# Patient Record
Sex: FEMALE | Race: BLACK OR AFRICAN AMERICAN | NOT HISPANIC OR LATINO | Employment: FULL TIME | URBAN - METROPOLITAN AREA
[De-identification: names, ages, dates, MRNs, and addresses within clinical notes are randomized per-mention and may not be internally consistent; named-entity substitution may affect disease eponyms.]

---

## 2024-05-09 ENCOUNTER — TELEPHONE (OUTPATIENT)
Dept: URGENT CARE | Facility: CLINIC | Age: 34
End: 2024-05-09

## 2025-03-15 PROCEDURE — 87491 CHLMYD TRACH DNA AMP PROBE: CPT

## 2025-03-15 PROCEDURE — 87798 DETECT AGENT NOS DNA AMP: CPT

## 2025-03-15 PROCEDURE — 87661 TRICHOMONAS VAGINALIS AMPLIF: CPT

## 2025-03-15 PROCEDURE — 87591 N.GONORRHOEAE DNA AMP PROB: CPT

## 2025-03-15 PROCEDURE — 87147 CULTURE TYPE IMMUNOLOGIC: CPT

## 2025-03-15 PROCEDURE — 87801 DETECT AGNT MULT DNA AMPLI: CPT

## 2025-03-15 PROCEDURE — 87086 URINE CULTURE/COLONY COUNT: CPT

## 2025-03-17 ENCOUNTER — RESULTS FOLLOW-UP (OUTPATIENT)
Dept: URGENT CARE | Facility: CLINIC | Age: 35
End: 2025-03-17
Payer: COMMERCIAL

## 2025-03-17 DIAGNOSIS — A59.01 TRICHOMONAL VAGINITIS: Primary | ICD-10-CM

## 2025-03-18 RX ORDER — METRONIDAZOLE 500 MG/1
500 TABLET ORAL 2 TIMES DAILY
Qty: 14 TABLET | Refills: 0 | Status: SHIPPED | OUTPATIENT
Start: 2025-03-18

## 2025-03-19 NOTE — TELEPHONE ENCOUNTER
Encounter for orders only.  Trial dissolved p.o. sent to pharmacy on file for treatment of trichomoniasis.  Patient will be notified of results via [employer], clinical staff will attempt to contact patient via phone call.